# Patient Record
Sex: MALE | Race: WHITE | Employment: OTHER | URBAN - METROPOLITAN AREA
[De-identification: names, ages, dates, MRNs, and addresses within clinical notes are randomized per-mention and may not be internally consistent; named-entity substitution may affect disease eponyms.]

---

## 2019-06-06 RX ORDER — BENZONATATE 100 MG/1
100 CAPSULE ORAL 3 TIMES DAILY PRN
Status: ON HOLD | COMMUNITY
End: 2019-06-07

## 2019-06-06 RX ORDER — CODEINE PHOSPHATE AND GUAIFENESIN 10; 100 MG/5ML; MG/5ML
1-2 SOLUTION ORAL EVERY 4 HOURS PRN
COMMUNITY

## 2019-06-07 ENCOUNTER — ANESTHESIA (OUTPATIENT)
Dept: SURGERY | Facility: CLINIC | Age: 79
End: 2019-06-07
Payer: COMMERCIAL

## 2019-06-07 ENCOUNTER — ANESTHESIA EVENT (OUTPATIENT)
Dept: SURGERY | Facility: CLINIC | Age: 79
End: 2019-06-07
Payer: COMMERCIAL

## 2019-06-07 ENCOUNTER — HOSPITAL ENCOUNTER (OUTPATIENT)
Facility: CLINIC | Age: 79
Discharge: HOME | End: 2019-06-07
Attending: COLON & RECTAL SURGERY | Admitting: COLON & RECTAL SURGERY
Payer: COMMERCIAL

## 2019-06-07 VITALS
SYSTOLIC BLOOD PRESSURE: 146 MMHG | TEMPERATURE: 98.5 F | RESPIRATION RATE: 16 BRPM | HEIGHT: 68 IN | DIASTOLIC BLOOD PRESSURE: 90 MMHG | BODY MASS INDEX: 38.45 KG/M2 | OXYGEN SATURATION: 97 % | HEART RATE: 62 BPM | WEIGHT: 253.7 LBS

## 2019-06-07 DIAGNOSIS — Z98.890 S/P HEMORRHOIDECTOMY: Primary | ICD-10-CM

## 2019-06-07 DIAGNOSIS — Z87.19 S/P HEMORRHOIDECTOMY: Primary | ICD-10-CM

## 2019-06-07 PROCEDURE — 25000128 H RX IP 250 OP 636: Performed by: ANESTHESIOLOGY

## 2019-06-07 PROCEDURE — 71000012 ZZH RECOVERY PHASE 1 LEVEL 1 FIRST HR: Performed by: COLON & RECTAL SURGERY

## 2019-06-07 PROCEDURE — 36000050 ZZH SURGERY LEVEL 2 1ST 30 MIN: Performed by: COLON & RECTAL SURGERY

## 2019-06-07 PROCEDURE — 25800030 ZZH RX IP 258 OP 636: Performed by: NURSE ANESTHETIST, CERTIFIED REGISTERED

## 2019-06-07 PROCEDURE — 71000027 ZZH RECOVERY PHASE 2 EACH 15 MINS: Performed by: COLON & RECTAL SURGERY

## 2019-06-07 PROCEDURE — 40000170 ZZH STATISTIC PRE-PROCEDURE ASSESSMENT II: Performed by: COLON & RECTAL SURGERY

## 2019-06-07 PROCEDURE — 25000125 ZZHC RX 250: Performed by: COLON & RECTAL SURGERY

## 2019-06-07 PROCEDURE — 37000008 ZZH ANESTHESIA TECHNICAL FEE, 1ST 30 MIN: Performed by: COLON & RECTAL SURGERY

## 2019-06-07 PROCEDURE — 37000009 ZZH ANESTHESIA TECHNICAL FEE, EACH ADDTL 15 MIN: Performed by: COLON & RECTAL SURGERY

## 2019-06-07 PROCEDURE — 25000128 H RX IP 250 OP 636: Performed by: NURSE ANESTHETIST, CERTIFIED REGISTERED

## 2019-06-07 PROCEDURE — 25000125 ZZHC RX 250: Performed by: NURSE ANESTHETIST, CERTIFIED REGISTERED

## 2019-06-07 PROCEDURE — 27210794 ZZH OR GENERAL SUPPLY STERILE: Performed by: COLON & RECTAL SURGERY

## 2019-06-07 RX ORDER — SODIUM CHLORIDE 9 MG/ML
INJECTION, SOLUTION INTRAVENOUS CONTINUOUS
Status: DISCONTINUED | OUTPATIENT
Start: 2019-06-07 | End: 2019-06-07 | Stop reason: HOSPADM

## 2019-06-07 RX ORDER — HYDROMORPHONE HYDROCHLORIDE 1 MG/ML
.3-.5 INJECTION, SOLUTION INTRAMUSCULAR; INTRAVENOUS; SUBCUTANEOUS EVERY 10 MIN PRN
Status: DISCONTINUED | OUTPATIENT
Start: 2019-06-07 | End: 2019-06-07 | Stop reason: HOSPADM

## 2019-06-07 RX ORDER — MEPERIDINE HYDROCHLORIDE 25 MG/ML
12.5 INJECTION INTRAMUSCULAR; INTRAVENOUS; SUBCUTANEOUS
Status: DISCONTINUED | OUTPATIENT
Start: 2019-06-07 | End: 2019-06-07 | Stop reason: HOSPADM

## 2019-06-07 RX ORDER — FENTANYL CITRATE 50 UG/ML
50-100 INJECTION, SOLUTION INTRAMUSCULAR; INTRAVENOUS
Status: DISCONTINUED | OUTPATIENT
Start: 2019-06-07 | End: 2019-06-07 | Stop reason: HOSPADM

## 2019-06-07 RX ORDER — PROPOFOL 10 MG/ML
INJECTION, EMULSION INTRAVENOUS PRN
Status: DISCONTINUED | OUTPATIENT
Start: 2019-06-07 | End: 2019-06-07

## 2019-06-07 RX ORDER — FENTANYL CITRATE 50 UG/ML
INJECTION, SOLUTION INTRAMUSCULAR; INTRAVENOUS PRN
Status: DISCONTINUED | OUTPATIENT
Start: 2019-06-07 | End: 2019-06-07

## 2019-06-07 RX ORDER — ONDANSETRON 2 MG/ML
INJECTION INTRAMUSCULAR; INTRAVENOUS PRN
Status: DISCONTINUED | OUTPATIENT
Start: 2019-06-07 | End: 2019-06-07

## 2019-06-07 RX ORDER — FENTANYL CITRATE 50 UG/ML
25-50 INJECTION, SOLUTION INTRAMUSCULAR; INTRAVENOUS EVERY 5 MIN PRN
Status: DISCONTINUED | OUTPATIENT
Start: 2019-06-07 | End: 2019-06-07 | Stop reason: HOSPADM

## 2019-06-07 RX ORDER — IBUPROFEN 600 MG/1
600 TABLET, FILM COATED ORAL
Status: DISCONTINUED | OUTPATIENT
Start: 2019-06-07 | End: 2019-06-07 | Stop reason: HOSPADM

## 2019-06-07 RX ORDER — PROPOFOL 10 MG/ML
INJECTION, EMULSION INTRAVENOUS CONTINUOUS PRN
Status: DISCONTINUED | OUTPATIENT
Start: 2019-06-07 | End: 2019-06-07

## 2019-06-07 RX ORDER — ONDANSETRON 2 MG/ML
4 INJECTION INTRAMUSCULAR; INTRAVENOUS EVERY 30 MIN PRN
Status: DISCONTINUED | OUTPATIENT
Start: 2019-06-07 | End: 2019-06-07 | Stop reason: HOSPADM

## 2019-06-07 RX ORDER — FENTANYL CITRATE 50 UG/ML
25-50 INJECTION, SOLUTION INTRAMUSCULAR; INTRAVENOUS
Status: DISCONTINUED | OUTPATIENT
Start: 2019-06-07 | End: 2019-06-07 | Stop reason: HOSPADM

## 2019-06-07 RX ORDER — ACETAMINOPHEN 325 MG/1
650 TABLET ORAL EVERY 4 HOURS PRN
Qty: 50 TABLET | Refills: 0 | COMMUNITY
Start: 2019-06-07

## 2019-06-07 RX ORDER — ONDANSETRON 4 MG/1
4 TABLET, ORALLY DISINTEGRATING ORAL EVERY 30 MIN PRN
Status: DISCONTINUED | OUTPATIENT
Start: 2019-06-07 | End: 2019-06-07 | Stop reason: HOSPADM

## 2019-06-07 RX ORDER — NALOXONE HYDROCHLORIDE 0.4 MG/ML
.1-.4 INJECTION, SOLUTION INTRAMUSCULAR; INTRAVENOUS; SUBCUTANEOUS
Status: DISCONTINUED | OUTPATIENT
Start: 2019-06-07 | End: 2019-06-07 | Stop reason: HOSPADM

## 2019-06-07 RX ORDER — ONDANSETRON 4 MG/1
4 TABLET, ORALLY DISINTEGRATING ORAL
Status: DISCONTINUED | OUTPATIENT
Start: 2019-06-07 | End: 2019-06-07 | Stop reason: HOSPADM

## 2019-06-07 RX ORDER — HYDROCODONE BITARTRATE AND ACETAMINOPHEN 5; 325 MG/1; MG/1
1 TABLET ORAL
Status: DISCONTINUED | OUTPATIENT
Start: 2019-06-07 | End: 2019-06-07 | Stop reason: HOSPADM

## 2019-06-07 RX ORDER — SODIUM CHLORIDE, SODIUM LACTATE, POTASSIUM CHLORIDE, CALCIUM CHLORIDE 600; 310; 30; 20 MG/100ML; MG/100ML; MG/100ML; MG/100ML
INJECTION, SOLUTION INTRAVENOUS CONTINUOUS PRN
Status: DISCONTINUED | OUTPATIENT
Start: 2019-06-07 | End: 2019-06-07

## 2019-06-07 RX ORDER — SODIUM CHLORIDE, SODIUM LACTATE, POTASSIUM CHLORIDE, CALCIUM CHLORIDE 600; 310; 30; 20 MG/100ML; MG/100ML; MG/100ML; MG/100ML
INJECTION, SOLUTION INTRAVENOUS CONTINUOUS
Status: DISCONTINUED | OUTPATIENT
Start: 2019-06-07 | End: 2019-06-07 | Stop reason: HOSPADM

## 2019-06-07 RX ADMIN — FENTANYL CITRATE 50 MCG: 50 INJECTION, SOLUTION INTRAMUSCULAR; INTRAVENOUS at 18:34

## 2019-06-07 RX ADMIN — PROPOFOL 100 MCG/KG/MIN: 10 INJECTION, EMULSION INTRAVENOUS at 17:31

## 2019-06-07 RX ADMIN — FENTANYL CITRATE 50 MCG: 50 INJECTION, SOLUTION INTRAMUSCULAR; INTRAVENOUS at 18:22

## 2019-06-07 RX ADMIN — ONDANSETRON 4 MG: 2 INJECTION INTRAMUSCULAR; INTRAVENOUS at 17:34

## 2019-06-07 RX ADMIN — PROPOFOL 20 MG: 10 INJECTION, EMULSION INTRAVENOUS at 17:28

## 2019-06-07 RX ADMIN — MIDAZOLAM 0.5 MG: 1 INJECTION INTRAMUSCULAR; INTRAVENOUS at 17:25

## 2019-06-07 RX ADMIN — SODIUM CHLORIDE, POTASSIUM CHLORIDE, SODIUM LACTATE AND CALCIUM CHLORIDE: 600; 310; 30; 20 INJECTION, SOLUTION INTRAVENOUS at 17:26

## 2019-06-07 RX ADMIN — FENTANYL CITRATE 50 MCG: 50 INJECTION, SOLUTION INTRAMUSCULAR; INTRAVENOUS at 17:33

## 2019-06-07 RX ADMIN — PROPOFOL 20 MG: 10 INJECTION, EMULSION INTRAVENOUS at 17:32

## 2019-06-07 RX ADMIN — MIDAZOLAM 0.5 MG: 1 INJECTION INTRAMUSCULAR; INTRAVENOUS at 17:28

## 2019-06-07 ASSESSMENT — ENCOUNTER SYMPTOMS
SEIZURES: 0
ORTHOPNEA: 0
DYSRHYTHMIAS: 1

## 2019-06-07 ASSESSMENT — LIFESTYLE VARIABLES: TOBACCO_USE: 0

## 2019-06-07 ASSESSMENT — COPD QUESTIONNAIRES: COPD: 0

## 2019-06-07 ASSESSMENT — MIFFLIN-ST. JEOR: SCORE: 1850.28

## 2019-06-07 NOTE — DISCHARGE INSTRUCTIONS

## 2019-06-07 NOTE — ANESTHESIA PREPROCEDURE EVALUATION
Anesthesia Pre-Procedure Evaluation    Patient: Wally Day   MRN: [id number] : 1940          Preoperative Diagnosis: HEMMORHOIDS    Procedure(s):  HEMORRHOIDECTOMY    Past Medical History:   Diagnosis Date     Abnormal cardiovascular stress test      Arrhythmia     afib      Atrial fibrillation (H)      BPH (benign prostatic hyperplasia)      Essential hypertension, malignant      Hyperlipemia      Lumbar myelopathy (H)      Numbness and tingling     bilat legs     Obesity, unspecified      Obstructive sleep apnea (adult) (pediatric)      Other chronic pain      Varicose veins of legs      Vascular insufficiency      Past Surgical History:   Procedure Laterality Date     BIOPSY      prostate     C NONSPECIFIC PROCEDURE      bilat venous closure cath      CL AFF SURGICAL PATHOLOGY       CL AFF SURGICAL PATHOLOGY       COLONOSCOPY       DECOMPRESSION, FUSION LUMBAR POSTERIOR ONE LEVEL, COMBINED N/A 2016    Procedure: COMBINED DECOMPRESSION, FUSION LUMBAR POSTERIOR ONE LEVEL;  Surgeon: Isidoro Garcia MD;  Location:  OR     ENT SURGERY      tonsillectomy     GENITOURINARY SURGERY      prostate     HCL SQUAMOUS CELL CARCINOMA AG      removed from top of head     LAMINECTOMY LUMBAR THREE+ LEVELS N/A 2016    Procedure: LAMINECTOMY LUMBAR THREE+ LEVELS;  Surgeon: Isidoro Garcia MD;  Location:  OR     ORTHOPEDIC SURGERY      (R)  &  (L) total knee     No Known Allergies  Social History     Tobacco Use     Smoking status: Never Smoker     Smokeless tobacco: Never Used     Tobacco comment: occas cigar   Substance Use Topics     Alcohol use: Yes     Comment: very rare     Prior to Admission medications    Medication Sig Start Date End Date Taking? Authorizing Provider   Ascorbic Acid (VITAMIN C PO) Take 1,000 mg by mouth daily    Yes Reported, Patient   ASPIRIN EC PO Take 81 mg by mouth daily    Yes Reported, Patient   ATENOLOL PO Take 12.5 mg by mouth daily (Patient takes  0.5 x 25mg tablet= 12.5mg)   Yes Reported, Patient   Calcium Carbonate (CALCIUM 600 PO) Take 1 tablet by mouth 2 times daily   Yes Reported, Patient   Cholecalciferol (VITAMIN D PO) Take 5,000 mg by mouth daily   Yes Reported, Patient   FLUTICASONE PROPIONATE, NASAL, NA Spray 2 sprays into both nostrils daily    Yes Reported, Patient   LISINOPRIL PO Take 5 mg by mouth daily   Yes Reported, Patient   loratadine (CLARITIN) 10 MG tablet Take 10 mg by mouth daily Equate brand 24 hour   Yes Reported, Patient   NIFEdipine (PROCARDIA XL PO) Take 90 mg by mouth daily   Yes Reported, Patient   Omega-3 Fatty Acids (OMEGA-3 FISH OIL PO) Take 1 g by mouth daily   Yes Reported, Patient   psyllium (METAMUCIL) 58.6 % POWD Take 1 teaspoonful by mouth At Bedtime   Yes Reported, Patient   SIMVASTATIN PO Take 40 mg by mouth At Bedtime   Yes Reported, Patient   TAMSULOSIN HCL PO Take 0.4 mg by mouth At Bedtime   Yes Reported, Patient   triamterene-hydrochlorothiazide (MAXZIDE) 75-50 MG per tablet Take 0.5 tablets by mouth daily   Yes Reported, Patient   guaiFENesin-codeine (ROBITUSSIN AC) 100-10 MG/5ML solution Take 1-2 tsp. by mouth every 4 hours as needed for cough    Reported, Patient   Nitroglycerin (NITROSTAT SL) Place 0.4 mg under the tongue every 5 minutes as needed for chest pain    Reported, Patient   order for DME Equipment being ordered: Walker Wheels ()  Treatment Diagnosis: spondylolisthesis 8/16/16   Isidoro Garcia MD     Current Facility-Administered Medications Ordered in Epic   Medication Dose Route Frequency Last Rate Last Dose     sodium chloride 0.9% infusion   Intravenous Continuous         sodium phosphate (FLEET ENEMA) 1 enema  1 enema Rectal Once         No current [facility]-ordered outpatient medications on file.       sodium chloride       Recent Labs   Lab Test 08/09/16  0744      POTASSIUM 3.6   CHLORIDE 105   CO2 26   ANIONGAP 6   *   BUN 11   CR 0.80   RICHI 7.6*     Recent Labs   Lab  Test 08/11/16  0735 08/10/16  0735   HGB 10.3* 10.8*     Recent Labs   Lab Test 08/08/16  0723   ABO O   RH  Pos     RECENT LABS:     Anesthesia Evaluation     . Pt has had prior anesthetic. Type: General    No history of anesthetic complications          ROS/MED HX    ENT/Pulmonary:     (+)sleep apnea, uses CPAP , . .   (-) tobacco use, asthma, COPD and recent URI   Neurologic:     (+)neuropathy    (-) seizures and CVA   Cardiovascular:     (+) Dyslipidemia, hypertension----. : . . . :. dysrhythmias (Paroxysmal - on ASA only - mostly resolved since started using CPAP ) a-fib, . Previous cardiac testing Echodate:2010results: Final Conclusion   Normal left ventricular ejection fraction estimated at 55-60%.   No obvious regional wall motion abnormalities.   Mild concentric left ventricular hypertrophy.   Normal left atrial size.   Mild mitral regurgitation.   Right ventricular systolic pressure estimated at 22 mmHg + RAP.date: results: date: results: date: results:         (-) angina, CAD, orthopnea/PND, syncope, irregular heartbeat/palpitations and angina   METS/Exercise Tolerance:     Hematologic:     (+) History of Transfusion -      Musculoskeletal:         GI/Hepatic:        (-) GERD and liver disease   Renal/Genitourinary:      (-) renal disease   Endo:     (+) Obesity, .   (-) Type II DM, thyroid disease and chronic steroid usage   Psychiatric:         Infectious Disease:         Malignancy:         Other:    (+) H/O Chronic Pain,                        Physical Exam      Airway   Mallampati: II  TM distance: >3 FB  Neck ROM: full    Dental   (+) caps    Cardiovascular   Rhythm and rate: regular and normal  (-) no murmur    Pulmonary    breath sounds clear to auscultation(-) no wheezes            Lab Results   Component Value Date    HGB 10.3 (L) 08/11/2016     08/09/2016    POTASSIUM 3.6 08/09/2016    CHLORIDE 105 08/09/2016    CO2 26 08/09/2016    BUN 11 08/09/2016    CR 0.80 08/09/2016     (H)  "08/09/2016    RICHI 7.6 (L) 08/09/2016       Preop Vitals  BP Readings from Last 3 Encounters:   06/07/19 139/81   08/16/16 135/79    Pulse Readings from Last 3 Encounters:   08/13/16 84      Resp Readings from Last 3 Encounters:   06/07/19 14   08/16/16 16    SpO2 Readings from Last 3 Encounters:   06/07/19 98%   08/16/16 99%      Temp Readings from Last 1 Encounters:   06/07/19 35.8  C (96.4  F) (Oral)    Ht Readings from Last 1 Encounters:   06/07/19 1.727 m (5' 8\")      Wt Readings from Last 1 Encounters:   06/07/19 115.1 kg (253 lb 11.2 oz)    Estimated body mass index is 38.57 kg/m  as calculated from the following:    Height as of this encounter: 1.727 m (5' 8\").    Weight as of this encounter: 115.1 kg (253 lb 11.2 oz).       Anesthesia Plan      History & Physical Review  History and physical reviewed and following examination; no interval change.    ASA Status:  2 .    NPO Status:  > 8 hours    Plan for MAC   PONV prophylaxis:  Ondansetron (or other 5HT-3)  Plan changed to MAC based on patient request after discussion with Dr. Butcher.       Postoperative Care  Postoperative pain management:  Multi-modal analgesia.      Consents  Anesthetic plan, risks, benefits and alternatives discussed with:  Patient..                 Antonio French MD  "

## 2019-06-07 NOTE — OP NOTE
BRIEF OPERATIVE NOTE :    Preop Dx: HEMMORHOIDS  Postop Dx Mixed hemorrhoids with prolapse  Proc: Hemorrhoidectomy  Surgeon(s)/Assist: Surgeon(s):  Simeon Butcher MD  Ans  Mac  Drains:  none  Spec:  none  Compl: none  Findings:  Mixed hemorrhoids  EBL:  5  IVF: 50cc  UOP:na  Condition on discharge from OR: Satisfactory    Simeon Butcher MD   [facility]   [phone].

## 2019-06-18 NOTE — OP NOTE
Procedure Date: 06/07/2019      PREOPERATIVE DIAGNOSES:  Rectal prolapse and prolapsing symptomatic internal hemorrhoids.      POSTOPERATIVE DIAGNOSES:  Rectal prolapse and prolapsing symptomatic internal hemorrhoids.      PROCEDURE:  Resection of rectal prolapse and excision of prolapsing internal hemorrhoids.      ANESTHESIA:  MAC.      SURGEON:  Simeon Butcher MD      HISTORY AND INDICATIONS:  The patient is a pleasant, active 78-year-old who unfortunately has been experiencing increasing issues with difficult evacuation, rectal bleeding and prolapse associated with bowel movements.  He has undergone previous hemorrhoid surgery, has several prominent excoriated internal hemorrhoids, and prolapse of the posterior aspect of the distal rectum noted on office exam.  Unfortunately, these tissues are also excoriated and clearly accounting for some of the bleeding and difficulties with discharge and evacuation.  Risks and benefits associated with surgical intervention were reviewed extensively.  He understands and presents at this time for said procedure.      DESCRIPTION OF PROCEDURE:  The patient was brought to the operating suite, placed in a prone jackknife position, prepped and draped in the usual manner and administered IV sedation.  A timeout was undertaken to assure the correct patient and procedure.  The perianal tissues were carefully evaluated and infiltrated with 30 mL of 0.5% Marcaine with 1:200,000 epinephrine.  With the area adequately anesthetized, again digital exam was performed.  He had minimal external disease consistent with the prior hemorrhoid surgery from years previously.  A Lockett bivalve was inserted and we could appreciate in the posterior half of the rectum rather significant mucosal prolapse.  This tissue easily passed through the anal canal and to the perianal tissues just with insertion of the Lockett bivalve anteriorly.  There was no rectal prolapse, but several prominent internal  hemorrhoids, which clearly were excoriated and contributing to bleeding as well.        At this point I obtained the LigaSure impact and utilized this to remove the 2 anterior internal hemorrhoids.  This was accomplished without difficulty and again, no significant external component was involved, so the tissues were sealed and excised without difficulty.  Posteriorly, in order to get a more complete dissection of the prolapsing tissue, I made an incision just above the dentate line, infiltrating the mucosa with some 0.5% Marcaine with 1:200,000 epinephrine, and then excised the redundant rectal tissue in the posterior half of the rectum.  I was able to dissect up several centimeters.  I came across the rectal wall with the LigaSure, and then to achieve hemostasis, I then sutured the distal rectal wall to the anal canal with a 3-0 Vicryl.        At the completion of the procedure, there was no additional tissue prolapsing into the canal.  Hemostasis was excellent.  The wound was dressed with loose gauze and the procedure was terminated without incident.      FINDINGS:  Rectal mucosal prolapse, prolapsing internal hemorrhoids, excision of rectal prolapse, and anterior prolapsing internal hemorrhoids accomplished without complication.        ESTIMATED BLOOD LOSS:  Less than 25 mL.       All counts were correct at the termination of the procedure.         PALLAVI VAUGHAN MD             D: 2019   T: 2019   MT: JORGE LUIS      Name:     SHRUTHI SANCHEZ   MRN:      [id number]        Account:        [id number]   :      1940           Procedure Date: 2019      Document: [id number]

## 2024-09-13 NOTE — ANESTHESIA POSTPROCEDURE EVALUATION
Patient: Wally Day    Procedure(s):  HEMORRHOIDECTOMY    Diagnosis:HEMMORHOIDS  Diagnosis Additional Information: No value filed.    Anesthesia Type:  MAC    Note:  Anesthesia Post Evaluation    Patient location during evaluation: PACU  Patient participation: Able to fully participate in evaluation  Level of consciousness: awake and alert  Pain management: adequate  Airway patency: patent  Cardiovascular status: acceptable and hemodynamically stable  Respiratory status: nonlabored ventilation, unassisted and acceptable  Hydration status: acceptable  PONV: none             Last vitals:  Vitals:    06/07/19 1800 06/07/19 1815 06/07/19 1830   BP: 131/74 (!) 138/102 146/90   Pulse:   62   Resp: 22 24 16   Temp: 36.9  C (98.5  F)     SpO2: 97% 97% 97%         Electronically Signed By: Antonio French MD  June 7, 2019  8:01 PM   - - -

## (undated) DEVICE — SYR 10ML FINGER CONTROL W/O NDL 309695

## (undated) DEVICE — SU VICRYL 3-0 SH 27" J316H

## (undated) DEVICE — SPONGE BALL KERLIX ROUND XL W/O STRING LATEX 4935

## (undated) DEVICE — NDL 27GA 1.25" 305136

## (undated) DEVICE — LINEN TOWEL PACK X5 5464

## (undated) DEVICE — PACK MINOR SBA15MIFSE

## (undated) DEVICE — DRAPE MINOR PROCEDURE LAP 29496

## (undated) DEVICE — SUCTION CANISTER MEDIVAC LINER 3000ML W/LID 65651-530

## (undated) DEVICE — GLOVE PROTEXIS MICRO 7.5  2D73PM75

## (undated) DEVICE — ESU LIGASURE IMPACT OPEN SEALER/DVDR CVD LG JAW LF4418

## (undated) DEVICE — SOL WATER IRRIG 1000ML BOTTLE 2F7114

## (undated) RX ORDER — FENTANYL CITRATE 50 UG/ML
INJECTION, SOLUTION INTRAMUSCULAR; INTRAVENOUS
Status: DISPENSED
Start: 2019-06-07

## (undated) RX ORDER — BUPIVACAINE HYDROCHLORIDE AND EPINEPHRINE 2.5; 5 MG/ML; UG/ML
INJECTION, SOLUTION EPIDURAL; INFILTRATION; INTRACAUDAL; PERINEURAL
Status: DISPENSED
Start: 2019-06-07

## (undated) RX ORDER — LIDOCAINE HYDROCHLORIDE 20 MG/ML
INJECTION, SOLUTION EPIDURAL; INFILTRATION; INTRACAUDAL; PERINEURAL
Status: DISPENSED
Start: 2019-06-07

## (undated) RX ORDER — PROPOFOL 10 MG/ML
INJECTION, EMULSION INTRAVENOUS
Status: DISPENSED
Start: 2019-06-07

## (undated) RX ORDER — ONDANSETRON 2 MG/ML
INJECTION INTRAMUSCULAR; INTRAVENOUS
Status: DISPENSED
Start: 2019-06-07

## (undated) RX ORDER — LIDOCAINE HYDROCHLORIDE 10 MG/ML
INJECTION, SOLUTION INFILTRATION; PERINEURAL
Status: DISPENSED
Start: 2019-06-07